# Patient Record
Sex: MALE | Race: WHITE | NOT HISPANIC OR LATINO | Employment: OTHER | URBAN - METROPOLITAN AREA
[De-identification: names, ages, dates, MRNs, and addresses within clinical notes are randomized per-mention and may not be internally consistent; named-entity substitution may affect disease eponyms.]

---

## 2018-02-21 ENCOUNTER — HOSPITAL ENCOUNTER (EMERGENCY)
Facility: HOSPITAL | Age: 33
Discharge: HOME | End: 2018-02-21
Attending: EMERGENCY MEDICINE | Admitting: EMERGENCY MEDICINE

## 2018-02-21 ENCOUNTER — HOSPITAL ENCOUNTER (INPATIENT)
Facility: HOSPITAL | Age: 33
LOS: 4 days | Discharge: HOME | End: 2018-02-25
Attending: SPECIALIST | Admitting: SPECIALIST

## 2018-02-21 VITALS
RESPIRATION RATE: 16 BRPM | HEART RATE: 98 BPM | HEIGHT: 71 IN | TEMPERATURE: 98.1 F | WEIGHT: 220 LBS | BODY MASS INDEX: 30.8 KG/M2 | SYSTOLIC BLOOD PRESSURE: 136 MMHG | OXYGEN SATURATION: 96 % | DIASTOLIC BLOOD PRESSURE: 72 MMHG

## 2018-02-21 DIAGNOSIS — F32.A DEPRESSION, UNSPECIFIED DEPRESSION TYPE: Primary | ICD-10-CM

## 2018-02-21 DIAGNOSIS — R45.851 SUICIDAL IDEATIONS: ICD-10-CM

## 2018-02-21 PROBLEM — F34.1 DYSTHYMIC DISORDER: Status: ACTIVE | Noted: 2018-02-21

## 2018-02-21 PROBLEM — Z72.0 TOBACCO ABUSE: Status: ACTIVE | Noted: 2018-02-21

## 2018-02-21 PROBLEM — F32.9 MAJOR DEPRESSIVE DISORDER: Status: ACTIVE | Noted: 2018-02-21

## 2018-02-21 PROBLEM — F19.20 DRUG ABUSE AND DEPENDENCE (HCC): Status: ACTIVE | Noted: 2018-02-21

## 2018-02-21 LAB
ALBUMIN SERPL-MCNC: 4.3 G/DL (ref 3.5–5.2)
ALBUMIN/GLOB SERPL: 1.5 G/DL
ALP SERPL-CCNC: 92 U/L (ref 39–117)
ALT SERPL W P-5'-P-CCNC: 164 U/L (ref 1–41)
AMPHET+METHAMPHET UR QL: NEGATIVE
ANION GAP SERPL CALCULATED.3IONS-SCNC: 11.5 MMOL/L
AST SERPL-CCNC: 82 U/L (ref 1–40)
BARBITURATES UR QL SCN: NEGATIVE
BASOPHILS # BLD AUTO: 0.02 10*3/MM3 (ref 0–0.2)
BASOPHILS NFR BLD AUTO: 0.2 % (ref 0–1.5)
BENZODIAZ UR QL SCN: NEGATIVE
BILIRUB SERPL-MCNC: 1.2 MG/DL (ref 0.1–1.2)
BUN BLD-MCNC: 18 MG/DL (ref 6–20)
BUN/CREAT SERPL: 20.2 (ref 7–25)
CALCIUM SPEC-SCNC: 8.8 MG/DL (ref 8.6–10.5)
CANNABINOIDS SERPL QL: NEGATIVE
CHLORIDE SERPL-SCNC: 99 MMOL/L (ref 98–107)
CO2 SERPL-SCNC: 26.5 MMOL/L (ref 22–29)
COCAINE UR QL: NEGATIVE
CREAT BLD-MCNC: 0.89 MG/DL (ref 0.76–1.27)
DEPRECATED RDW RBC AUTO: 45.6 FL (ref 37–54)
EOSINOPHIL # BLD AUTO: 0.28 10*3/MM3 (ref 0–0.7)
EOSINOPHIL NFR BLD AUTO: 2.9 % (ref 0.3–6.2)
ERYTHROCYTE [DISTWIDTH] IN BLOOD BY AUTOMATED COUNT: 13.2 % (ref 11.5–14.5)
ETHANOL BLD-MCNC: 42 MG/DL (ref 0–10)
ETHANOL UR QL: 0.04 %
GFR SERPL CREATININE-BSD FRML MDRD: 98 ML/MIN/1.73
GLOBULIN UR ELPH-MCNC: 2.9 GM/DL
GLUCOSE BLD-MCNC: 98 MG/DL (ref 65–99)
HCT VFR BLD AUTO: 45.5 % (ref 40.4–52.2)
HGB BLD-MCNC: 15.2 G/DL (ref 13.7–17.6)
IMM GRANULOCYTES # BLD: 0.02 10*3/MM3 (ref 0–0.03)
IMM GRANULOCYTES NFR BLD: 0.2 % (ref 0–0.5)
LYMPHOCYTES # BLD AUTO: 3.2 10*3/MM3 (ref 0.9–4.8)
LYMPHOCYTES NFR BLD AUTO: 32.7 % (ref 19.6–45.3)
MCH RBC QN AUTO: 31.7 PG (ref 27–32.7)
MCHC RBC AUTO-ENTMCNC: 33.4 G/DL (ref 32.6–36.4)
MCV RBC AUTO: 94.8 FL (ref 79.8–96.2)
METHADONE UR QL SCN: NEGATIVE
MONOCYTES # BLD AUTO: 0.93 10*3/MM3 (ref 0.2–1.2)
MONOCYTES NFR BLD AUTO: 9.5 % (ref 5–12)
NEUTROPHILS # BLD AUTO: 5.33 10*3/MM3 (ref 1.9–8.1)
NEUTROPHILS NFR BLD AUTO: 54.5 % (ref 42.7–76)
OPIATES UR QL: NEGATIVE
OXYCODONE UR QL SCN: NEGATIVE
PLATELET # BLD AUTO: 217 10*3/MM3 (ref 140–500)
PMV BLD AUTO: 11.4 FL (ref 6–12)
POTASSIUM BLD-SCNC: 4.1 MMOL/L (ref 3.5–5.2)
PROT SERPL-MCNC: 7.2 G/DL (ref 6–8.5)
RBC # BLD AUTO: 4.8 10*6/MM3 (ref 4.6–6)
SODIUM BLD-SCNC: 137 MMOL/L (ref 136–145)
T4 FREE SERPL-MCNC: 1.12 NG/DL (ref 0.93–1.7)
TSH SERPL DL<=0.05 MIU/L-ACNC: 0.35 MIU/ML (ref 0.27–4.2)
WBC NRBC COR # BLD: 9.78 10*3/MM3 (ref 4.5–10.7)

## 2018-02-21 PROCEDURE — 36415 COLL VENOUS BLD VENIPUNCTURE: CPT

## 2018-02-21 PROCEDURE — 80307 DRUG TEST PRSMV CHEM ANLYZR: CPT | Performed by: NURSE PRACTITIONER

## 2018-02-21 PROCEDURE — 85025 COMPLETE CBC W/AUTO DIFF WBC: CPT | Performed by: SPECIALIST

## 2018-02-21 PROCEDURE — 80053 COMPREHEN METABOLIC PANEL: CPT | Performed by: SPECIALIST

## 2018-02-21 PROCEDURE — 84443 ASSAY THYROID STIM HORMONE: CPT | Performed by: SPECIALIST

## 2018-02-21 PROCEDURE — 90791 PSYCH DIAGNOSTIC EVALUATION: CPT | Performed by: SOCIAL WORKER

## 2018-02-21 PROCEDURE — 84439 ASSAY OF FREE THYROXINE: CPT | Performed by: SPECIALIST

## 2018-02-21 PROCEDURE — 99284 EMERGENCY DEPT VISIT MOD MDM: CPT

## 2018-02-21 RX ORDER — ACETAMINOPHEN 325 MG/1
650 TABLET ORAL EVERY 4 HOURS PRN
Status: DISCONTINUED | OUTPATIENT
Start: 2018-02-21 | End: 2018-02-25 | Stop reason: HOSPADM

## 2018-02-21 RX ORDER — NICOTINE 21 MG/24HR
1 PATCH, TRANSDERMAL 24 HOURS TRANSDERMAL EVERY 24 HOURS
Status: DISCONTINUED | OUTPATIENT
Start: 2018-02-21 | End: 2018-02-25 | Stop reason: HOSPADM

## 2018-02-21 RX ORDER — ALUMINA, MAGNESIA, AND SIMETHICONE 2400; 2400; 240 MG/30ML; MG/30ML; MG/30ML
15 SUSPENSION ORAL EVERY 6 HOURS PRN
Status: DISCONTINUED | OUTPATIENT
Start: 2018-02-21 | End: 2018-02-25 | Stop reason: HOSPADM

## 2018-02-21 RX ADMIN — NICOTINE 1 PATCH: 21 PATCH, EXTENDED RELEASE TRANSDERMAL at 18:04

## 2018-02-21 RX ADMIN — ALUMINUM HYDROXIDE, MAGNESIUM HYDROXIDE, AND DIMETHICONE 15 ML: 400; 400; 40 SUSPENSION ORAL at 13:10

## 2018-02-22 LAB
BILIRUB UR QL STRIP: NEGATIVE
CLARITY UR: ABNORMAL
COLOR UR: ABNORMAL
GLUCOSE UR STRIP-MCNC: NEGATIVE MG/DL
HGB UR QL STRIP.AUTO: NEGATIVE
KETONES UR QL STRIP: NEGATIVE
LEUKOCYTE ESTERASE UR QL STRIP.AUTO: NEGATIVE
NITRITE UR QL STRIP: NEGATIVE
PH UR STRIP.AUTO: 6 [PH] (ref 5–8)
PROT UR QL STRIP: ABNORMAL
SP GR UR STRIP: >=1.03 (ref 1–1.03)
UROBILINOGEN UR QL STRIP: ABNORMAL

## 2018-02-22 PROCEDURE — 81003 URINALYSIS AUTO W/O SCOPE: CPT | Performed by: SPECIALIST

## 2018-02-22 RX ORDER — LORAZEPAM 1 MG/1
2 TABLET ORAL EVERY 6 HOURS PRN
Status: DISCONTINUED | OUTPATIENT
Start: 2018-02-22 | End: 2018-02-25 | Stop reason: HOSPADM

## 2018-02-22 RX ORDER — ZIPRASIDONE HYDROCHLORIDE 20 MG/1
20 CAPSULE ORAL 2 TIMES DAILY WITH MEALS
Status: DISCONTINUED | OUTPATIENT
Start: 2018-02-22 | End: 2018-02-22

## 2018-02-22 RX ORDER — ZIPRASIDONE MESYLATE 20 MG/ML
20 INJECTION, POWDER, LYOPHILIZED, FOR SOLUTION INTRAMUSCULAR EVERY 6 HOURS PRN
Status: DISCONTINUED | OUTPATIENT
Start: 2018-02-22 | End: 2018-02-25 | Stop reason: HOSPADM

## 2018-02-22 RX ORDER — ZIPRASIDONE HYDROCHLORIDE 40 MG/1
40 CAPSULE ORAL 2 TIMES DAILY WITH MEALS
Status: DISCONTINUED | OUTPATIENT
Start: 2018-02-22 | End: 2018-02-25 | Stop reason: HOSPADM

## 2018-02-22 RX ADMIN — ZIPRASIDONE HCL 40 MG: 40 CAPSULE ORAL at 17:43

## 2018-02-22 RX ADMIN — ZIPRASIDONE HCL 40 MG: 40 CAPSULE ORAL at 12:22

## 2018-02-22 RX ADMIN — ALUMINUM HYDROXIDE, MAGNESIUM HYDROXIDE, AND DIMETHICONE 15 ML: 400; 400; 40 SUSPENSION ORAL at 22:01

## 2018-02-22 RX ADMIN — NICOTINE 1 PATCH: 21 PATCH, EXTENDED RELEASE TRANSDERMAL at 11:50

## 2018-02-23 RX ADMIN — ALUMINUM HYDROXIDE, MAGNESIUM HYDROXIDE, AND DIMETHICONE 15 ML: 400; 400; 40 SUSPENSION ORAL at 23:12

## 2018-02-23 RX ADMIN — ACETAMINOPHEN 650 MG: 325 TABLET, FILM COATED ORAL at 23:11

## 2018-02-23 RX ADMIN — ZIPRASIDONE HCL 40 MG: 40 CAPSULE ORAL at 09:00

## 2018-02-23 RX ADMIN — ALUMINUM HYDROXIDE, MAGNESIUM HYDROXIDE, AND DIMETHICONE 15 ML: 400; 400; 40 SUSPENSION ORAL at 17:22

## 2018-02-23 RX ADMIN — ZIPRASIDONE HCL 40 MG: 40 CAPSULE ORAL at 17:50

## 2018-02-24 LAB
CHOLEST SERPL-MCNC: 193 MG/DL (ref 0–200)
HDLC SERPL-MCNC: 37 MG/DL (ref 40–60)
LDLC SERPL CALC-MCNC: 102 MG/DL (ref 0–100)
LDLC/HDLC SERPL: 2.76 {RATIO}
TRIGL SERPL-MCNC: 270 MG/DL (ref 0–150)
VLDLC SERPL-MCNC: 54 MG/DL (ref 5–40)

## 2018-02-24 PROCEDURE — 80061 LIPID PANEL: CPT | Performed by: SPECIALIST

## 2018-02-24 RX ORDER — PANTOPRAZOLE SODIUM 40 MG/1
40 TABLET, DELAYED RELEASE ORAL
Status: DISCONTINUED | OUTPATIENT
Start: 2018-02-24 | End: 2018-02-25 | Stop reason: HOSPADM

## 2018-02-24 RX ADMIN — ALUMINUM HYDROXIDE, MAGNESIUM HYDROXIDE, AND DIMETHICONE 15 ML: 400; 400; 40 SUSPENSION ORAL at 12:15

## 2018-02-24 RX ADMIN — NICOTINE 1 PATCH: 21 PATCH, EXTENDED RELEASE TRANSDERMAL at 08:17

## 2018-02-24 RX ADMIN — PANTOPRAZOLE SODIUM 40 MG: 40 TABLET, DELAYED RELEASE ORAL at 13:06

## 2018-02-25 VITALS
RESPIRATION RATE: 18 BRPM | DIASTOLIC BLOOD PRESSURE: 61 MMHG | HEART RATE: 86 BPM | TEMPERATURE: 97.4 F | OXYGEN SATURATION: 96 % | SYSTOLIC BLOOD PRESSURE: 99 MMHG

## 2018-02-25 RX ORDER — PANTOPRAZOLE SODIUM 40 MG/1
40 TABLET, DELAYED RELEASE ORAL DAILY
Qty: 30 TABLET | Refills: 0 | OUTPATIENT
Start: 2018-02-25 | End: 2019-03-12

## 2018-02-25 RX ORDER — NICOTINE 21 MG/24HR
1 PATCH, TRANSDERMAL 24 HOURS TRANSDERMAL EVERY 24 HOURS
Qty: 7 PATCH | Refills: 0 | OUTPATIENT
Start: 2018-02-26 | End: 2019-03-12

## 2018-02-25 RX ADMIN — PANTOPRAZOLE SODIUM 40 MG: 40 TABLET, DELAYED RELEASE ORAL at 06:37

## 2018-02-25 RX ADMIN — NICOTINE 1 PATCH: 21 PATCH, EXTENDED RELEASE TRANSDERMAL at 09:26

## 2019-03-11 ENCOUNTER — HOSPITAL ENCOUNTER (EMERGENCY)
Facility: HOSPITAL | Age: 34
Discharge: HOME | End: 2019-03-12
Attending: EMERGENCY MEDICINE | Admitting: EMERGENCY MEDICINE

## 2019-03-11 DIAGNOSIS — F19.10 DRUG ABUSE (HCC): Primary | ICD-10-CM

## 2019-03-11 LAB
ETHANOL BLD-MCNC: 13 MG/DL (ref 0–10)
ETHANOL UR QL: 0.01 %

## 2019-03-11 PROCEDURE — 99285 EMERGENCY DEPT VISIT HI MDM: CPT

## 2019-03-11 PROCEDURE — 80307 DRUG TEST PRSMV CHEM ANLYZR: CPT | Performed by: PHYSICIAN ASSISTANT

## 2019-03-12 VITALS
BODY MASS INDEX: 28.18 KG/M2 | RESPIRATION RATE: 12 BRPM | SYSTOLIC BLOOD PRESSURE: 146 MMHG | OXYGEN SATURATION: 94 % | WEIGHT: 201.3 LBS | HEART RATE: 114 BPM | HEIGHT: 71 IN | DIASTOLIC BLOOD PRESSURE: 94 MMHG | TEMPERATURE: 97.4 F

## 2019-03-12 LAB
AMPHET+METHAMPHET UR QL: POSITIVE
BARBITURATES UR QL SCN: NEGATIVE
BENZODIAZ UR QL SCN: NEGATIVE
CANNABINOIDS SERPL QL: NEGATIVE
COCAINE UR QL: NEGATIVE
METHADONE UR QL SCN: NEGATIVE
OPIATES UR QL: NEGATIVE
OXYCODONE UR QL SCN: NEGATIVE

## 2019-03-12 PROCEDURE — 90791 PSYCH DIAGNOSTIC EVALUATION: CPT

## 2019-03-12 RX ORDER — RISPERIDONE 1 MG/ML
2 SOLUTION ORAL ONCE
Status: COMPLETED | OUTPATIENT
Start: 2019-03-12 | End: 2019-03-12

## 2019-03-12 RX ORDER — LORAZEPAM 1 MG/1
2 TABLET ORAL ONCE
Status: COMPLETED | OUTPATIENT
Start: 2019-03-12 | End: 2019-03-12

## 2019-03-12 RX ADMIN — RISPERIDONE 2 MG: 1 SOLUTION ORAL at 03:22

## 2019-03-12 RX ADMIN — LORAZEPAM 2 MG: 1 TABLET ORAL at 03:11

## 2019-03-12 NOTE — CONSULTS
"Pt appears somewhat bizarre on approach, asked about his living situation stares at me with no verbal response. Reports he is no longer homeless and that that has changed. Asked what has changed, states at me for a long time and then asks, \"which living situation is that\" asked about his living situation, replies (after long pause) \"uh, [facility] Asked what help he was seeking, \"to get back on medicine\" continues staring at me bizarrely.     Reports he drank beer tonight.     Asked, per his UDS screen, when he last used amphetamines, states, \"I don't really know\", and then repeats this phrase when asked if it was days ago, weeks ago, or months ago. Appears evasive on f/u. Asked (per hx) when he last used Coricidin, DXM, Dextromethorphan or cough syrup was, pt states at me with no verbal response. Asked if it was tonight, \"not tonight, no\". Asked if it was within the last couple days, again stares at me with no verbal repsonse.     Asked if he has had any recent thoughts of suicide or self harm, \"I know I haven't been feeling good\". Asked what he means by that, \"I just have not been feeling good\" declines to elaborate. Denies on f/u having thought about hurting himself recently. Asked if he has ever tried to hurt himself, [long pause], \"um I don't know. I've had things happen here and there and stuff like that or whatever\".     Asked if he has had any recent thoughts of harming others, \"no\".     States that he called EMS for help getting to [facility]. Doesn't elaborate on why other than above.     Asked if he has ever had an outpatient psychiatrist or therapist, \"not really that I can remember\".     Asked how long it's been since he slept, long mute pause. On f/u, asked if he has slept in the past 24 hours, \"have I slept any in the past 24 hours? It's been a little while since I got any rest\" unable to say how long on f/u.     Asked about how many times he has " "had residential substance abuse treatment, \"you're saying to get sober? .... Uh how many times I've been sober?\" I clarified that I meant how many times he has stayed somewhere to get sober, pt replies, \"I've had different sobriety dates, so\" (remains mute).     Appears to have difficulty processing the question when asked the last time he was in an inpatient psychiatric hospital. Eventually appears to focus on my asking him about [facility] ([facility] with [facility]) as a place he may have been recently, then reports that \"I think I was there recently\", referring to [facility], but unable to tell me specifically when that was.     Asked about about his living situation, \"it's been a little bit of here and a little bit of there, I was at [facility], then I was at [facility] [drug rehab] for a couple days\", asked what has happened since then, \"well, I guess I went and got drunk for a couple days\".     Asked about income, \"well, I've worked different places here and there ... 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 Denies being on disability.     Shrugs when asked about family and social support. I said I can move on, pt replies, \"well, I mean, I do talk to a bunch of family sometimes, so\".     Asked what kind of place we are in, \"I guess a [facility] place\". Asked if it's a [facility] of a Protestant hospital, \"I don't know\". 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 And states he thinks this is a hospital on f/u. Able to state year, asked president, \"well I'm guessing it's [place] right now\".     Asked if he has been aggressive with others in past year, shrugs and nods slightly, as though to indicate that this may be a possibility, but declines to answer on f/u questions.     I asked if he had any questions for me, pt asks, \"so am I gonna be going upstairs?\" Educated about process. Then comments, \"I was supposed to be going to [facility]\", indicated he wanted to go back there, receptive " "to additional education about process.     Asked if he has any specific medical diagnoses, \"uh, specifical medical diagnosis? uh, me just being me\". No medical diagnoses reported on f/u. Denies any current medicines.     Consistent with prior documentation, pt reports, \"I won't take Geodon\", has listed allergy of haldol (unable to state reaction), but would like some medicine to help him sleep.     Asked what he would do if discharged from hospital tonight: \"well, I'd go back over to [facility]\".     I spoke with Sumi, intake staff with [facility], who confirms (roughly consistent with pt's vague account to me) that pt got out of [facility], on 3/4/19.     Case discussed with Dr. Car, who advised that pt does not meet criteria for inpatient psychiatric admission and to proceed with d/c. Plan to d/c with substance abuse resources, GABRIELLE Nguyen agrees with plan.                                     "

## 2019-03-12 NOTE — ED PROVIDER NOTES
" EMERGENCY DEPARTMENT ENCOUNTER    CHIEF COMPLAINT  Chief Complaint: Psychiatric Evaluation  History given by: Patient  History limited by:   Room Number: 26/26  PMD: Provider, No Known      HPI:  Pt is a 33 y.o. male who presents for psychiatric evaluation. Pt reports that he would like to go to [facility]. He states that he is not currently taking any of his medications. Pt also reports some A/V hallucinations. He states that he is currently homeless. No SI or HI. He denies any medical complaint.    Duration: ongoing  Timing: constant  Progression: worsening  Associated Symptoms: hallucinations  Treatment before arrival: none    PAST MEDICAL HISTORY  Active Ambulatory Problems     Diagnosis Date Noted   • Dysthymic disorder 02/21/2018   • Suicidal ideations 02/21/2018   • Major depressive disorder 02/21/2018   • Tobacco abuse 02/21/2018   • Drug abuse and dependence (CMS/[facility]) 02/21/2018     Resolved Ambulatory Problems     Diagnosis Date Noted   • No Resolved Ambulatory Problems     Past Medical History:   Diagnosis Date   • Depression    • Hep C w/o coma, chronic (CMS/HCC) 2016       PAST SURGICAL HISTORY  Past Surgical History:   Procedure Laterality Date   • KIDNEY SURGERY      \"6 surgeries\" drain tube involved.       FAMILY HISTORY  Family History   Problem Relation Age of Onset   • Alcohol abuse Father        SOCIAL HISTORY  Social History     Socioeconomic History   • Marital status: Single     Spouse name: Not on file   • Number of children: Not on file   • Years of education: Not on file   • Highest education level: Not on file   Social Needs   • Financial resource strain: Not on file   • Food insecurity - worry: Not on file   • Food insecurity - inability: Not on file   • Transportation needs - medical: Not on file   • Transportation needs - non-medical: Not on file   Occupational History   • Not on file   Tobacco Use   • Smoking status: Current Some Day Smoker     Packs/day: 1.00     Types: Cigarettes "   Substance and Sexual Activity   • Alcohol use: Yes   • Drug use: Yes     Types: Methamphetamines     Comment: not for a year/ heroin   • Sexual activity: Defer   Other Topics Concern   • Not on file   Social History Narrative   • Not on file       ALLERGIES  Haldol [haloperidol]    REVIEW OF SYSTEMS  Review of Systems   Constitutional: Negative for activity change, appetite change and fever.   HENT: Negative for congestion and sore throat.    Eyes: Negative.    Respiratory: Negative for cough and shortness of breath.    Cardiovascular: Negative for chest pain and leg swelling.   Gastrointestinal: Negative for abdominal pain, diarrhea and vomiting.   Endocrine: Negative.    Genitourinary: Negative for decreased urine volume and dysuria.   Musculoskeletal: Negative for neck pain.   Skin: Negative for rash and wound.   Allergic/Immunologic: Negative.    Neurological: Negative for weakness, numbness and headaches.   Hematological: Negative.    Psychiatric/Behavioral: Positive for hallucinations. Negative for suicidal ideas.   All other systems reviewed and are negative.      PHYSICAL EXAM  ED Triage Vitals [03/11/19 2143]   Temp Heart Rate Resp BP SpO2   98.9 °F (37.2 °C) 117 18 (!) 151/111 99 %      Temp src Heart Rate Source Patient Position BP Location FiO2 (%)   -- -- -- -- --       Physical Exam   Constitutional: He is oriented to person, place, and time. No distress.   HENT:   Head: Normocephalic and atraumatic.   Eyes: EOM are normal. Pupils are equal, round, and reactive to light.   Neck: Normal range of motion. Neck supple.   Cardiovascular: Normal rate, regular rhythm and normal heart sounds.   Pulmonary/Chest: Effort normal and breath sounds normal. No respiratory distress.   Abdominal: Soft. There is no tenderness. There is no rebound and no guarding.   Musculoskeletal: Normal range of motion. He exhibits no edema.   Neurological: He is alert and oriented to person, place, and time. He has normal sensation  and normal strength.   Skin: Skin is warm and dry.   Psychiatric: Mood and affect normal. He expresses no homicidal and no suicidal ideation.   Odd affect   Nursing note and vitals reviewed.      LAB RESULTS  Lab Results (last 24 hours)     Procedure Component Value Units Date/Time    Ethanol [[id number]]  (Abnormal) Collected:  03/11/19 2223    Specimen:  Blood from Arm, Left Updated:  03/11/19 2256     Ethanol 13 mg/dL      Ethanol % 0.013 %     Urine Drug Screen - Urine, Clean Catch [[id number]]  (Abnormal) Collected:  03/11/19 2311    Specimen:  Urine, Clean Catch Updated:  03/12/19 0016     Amphet/Methamphet, Screen Positive     Barbiturates Screen, Urine Negative     Benzodiazepine Screen, Urine Negative     Cocaine Screen, Urine Negative     Opiate Screen Negative     THC, Screen, Urine Negative     Methadone Screen, Urine Negative     Oxycodone Screen, Urine Negative    Narrative:       Negative Thresholds For Drugs Screened:     Amphetamines               500 ng/ml   Barbiturates               200 ng/ml   Benzodiazepines            100 ng/ml   Cocaine                    300 ng/ml   Methadone                  300 ng/ml   Opiates                    300 ng/ml   Oxycodone                  100 ng/ml   THC                        50 ng/ml    The Normal Value for all drugs tested is negative. This report includes final unconfirmed screening results to be used for medical treatment purposes only. Unconfirmed results must not be used for non-medical purposes such as employment or legal testing. Clinical consideration should be applied to any drug of abuse test, particulary when unconfirmed results are used.          I ordered the above labs and reviewed the results    PROCEDURES  Procedures      PROGRESS AND CONSULTS     10:00 PM  Ordered UDS and EtOH. Pt placed on 72h hold.  10:35 PM  Reviewed pt's history and workup with Dr. Greenberg.  After a bedside evaluation; Dr Greenberg agrees with the plan of care.  3:24 AM  Pt has  been seen and evaluated by ACCESS. Would like to give Risperdal and Ativan. Will consult with Dr. Car.   6:00 AM  Pt care turned over to GABRIELLE Nguyen, pending ACCESS decision.         MEDICAL DECISION MAKING  Results were reviewed/discussed with the patient and they were also made aware of online access. Pt also made aware that some labs, such as cultures, will not be resulted during ER visit and follow up with PMD is necessary.     MDM  Number of Diagnoses or Management Options     Amount and/or Complexity of Data Reviewed  Review and summarize past medical records: yes (Admitted 2/21/18 for depression and SI. Pt had bizarre behavior was abusing dextromethorphan )           DIAGNOSIS  Final diagnoses:   None       DISPOSITION  Pending ACCESS decision    Latest Documented Vital Signs:  As of 5:42 AM  BP- 152/85 HR- 106 Temp- 97.4 °F (36.3 °C) (Oral) O2 sat- 96%    --  Documentation assistance provided by timothy Valiente for Karel Diop PA-C.  Information recorded by the scribe was done at my direction and has been verified and validated by me.       Fabiano Valiente  03/12/19 0527       Stevan Diop III, PA  03/12/19 0541       Stevan Diop III, PA  03/12/19 0543

## 2019-03-12 NOTE — ED NOTES
Pt noted to be standing up next to the stretcher in room 26. This RN to bed side. Pt was dozing off while standing up. Pt instructed to lay back down on the stretcher. Will continue to monitor patient. VS stable.      Abena Bass, RN  03/12/19 0816

## 2019-03-12 NOTE — ED PROVIDER NOTES
Pt is a 33 y.o. male who presents to the ED with confusion that started PTA. Pt was found at a gas station and brought by EMS. Pt states he has been out of his medications. Pt denies drug use at this time. Pt complains of generalized body aches currently. Pt denies nausea. Pt requested to the [facility] because he thinks it would be helpful. Pt did not answer any questions about hallucinations. Pt is unable to tell us prior medications or medical history        On exam,  Constitutional: mild distress  Cardiovascular: RRR, no murmur  Pulmonary: CTAB  Neurological: slow to answer questions, occasional jerky movements of head then will answer a question  Psych: Flat affect, vegetative.  No SI/HI      Plan: Review lab work and have ACCESS evaluate pt.       MD ATTESTATION NOTE    The MILE and I have discussed this patient's history, physical exam, and treatment plan.  I have reviewed the documentation and personally had a face to face interaction with the patient. I affirm the documentation and agree with the treatment and plan.  The attached note describes my personal findings.      Documentation assistance provided by timothy Ward for MD Samantha. Information recorded by the scribe was done at my direction and has been verified and validated by me.             Delmy Ward  03/11/19 8366       Placido Greenberg MD  03/12/19 8343

## 2019-03-12 NOTE — ED NOTES
Pt given turkey sandwich tray again. Pt is awaiting [employer] transport.     Abena Bass RN  03/12/19 1003       Abena Bass RN  03/12/19 1004

## 2019-03-12 NOTE — ED NOTES
Per D. Petersen, Access RN, patient will be discharged when he wakes up.     Jacki Shelton, RN  03/12/19 0664

## 2019-03-12 NOTE — PROGRESS NOTES
Pt medically cleared by Marc ANTHONY and by access. Per provider access states the pt can go to the [facility] for self evaluation for admit there. Arranged a LYFT ride to the facility. Pt signed contract. Primary aware and Lien Gaspar [facility] informed pt of the description of vehicle that will pick him up. Jodi VERMADischarge Planning Assessment  [facility]     Patient Name: Marcellus Wood  MRN: [id number]  Today's Date: 3/12/2019    Admit Date: 3/11/2019    Discharge Needs Assessment    No documentation.       Discharge Plan    No documentation.       Destination      No service coordination in this encounter.      Durable Medical Equipment      No service coordination in this encounter.      Dialysis/Infusion      No service coordination in this encounter.      Home Medical Care      No service coordination in this encounter.      Community Resources      No service coordination in this encounter.          Demographic Summary    No documentation.       Functional Status    No documentation.       Psychosocial    No documentation.       Abuse/Neglect    No documentation.       Legal    No documentation.       Substance Abuse    No documentation.       Patient Forms    No documentation.           Jodi Marr RN

## 2019-03-12 NOTE — ED NOTES
"Pt is awake and alert x4. Pt is walking around in his room and states that he is \"ready to go to the [facility]\". Pt given a turkey sandwich tray and a pepsi. S/W access to re-affirm that the patient needs to walk in to the [facility] for treatment. Pt confirms this verbally. Juliocesar HERNANDEZ/YASMANI.      Abena Bass RN  03/12/19 0952    "